# Patient Record
(demographics unavailable — no encounter records)

---

## 2025-05-02 NOTE — HISTORY OF PRESENT ILLNESS
[FreeTextEntry1] : 77F with PMH of HTN, HLD, and recent presentation to [facility] ED for L flank pain and nausea. UA nit neg LE neg. Cr 0.89 Pt was found to have an obstructing L UVJ stone with mild HUN on CTAP, as well as bilateral non-obstructing renal calculi. Pt was discharged with zofran and presents today for follow up. She continues to have mild bilateral flank discomfort, which she reports is similar to her initial presentation. She is unaware if she passed the stone. Denies any further episodes of nausea, no fevers, chills, hematuria. This is her first stone episode.  PMH: HTN, HLD PSH: eye surgery NKDA Fam hx: negative for  malignancy  CTAP 4/24/25: Mild L HUN to bladder with 2mm L UVJ stone and perinephric stranding. Bilateral non-obstructing renal calculi, largest measuring up to 3mm  RBUS today: no residual hydronephrosis and bilateral ureteral jets. Echogenic foci in bilateral kidneys

## 2025-05-02 NOTE — PHYSICAL EXAM
[General Appearance - Well Developed] : well developed [General Appearance - Well Nourished] : well nourished [Normal Appearance] : normal appearance [Well Groomed] : well groomed [General Appearance - In No Acute Distress] : no acute distress [] : no respiratory distress [Exaggerated Use Of Accessory Muscles For Inspiration] : no accessory muscle use [Costovertebral Angle Tenderness] : no ~M costovertebral angle tenderness [Normal Station and Gait] : the gait and station were normal for the patient's age [Oriented To Time, Place, And Person] : oriented to person, place, and time [Affect] : the affect was normal

## 2025-05-02 NOTE — END OF VISIT
[FreeTextEntry3] : I discussed the case with the Resident and agree with the findings and plan as documented in the Resident 's note. I have edited the note to reflect any changes in plan/assessment that I feel are appropriate. I agree with his evaluation and plan.

## 2025-05-02 NOTE — ASSESSMENT
[FreeTextEntry1] : 77F with first time stone episode, 2mm L UVJ stone with mild L HUN and bilateral non-obstructing renal calculi. Today RBUS demonstrated resolved mild hydronephrosis and bilateral ureteral jets, suggestive of stone passage although not definitive. She continues to indorse flank discomfort, although milder than presentation to ED. No fevers or chills. We reviewed the findings of the RBUS and discussed the sensitivities of different imaging modalities. Since she is still having symptoms we will plan to obtain a CTAP in 3 weeks while on MET. She will f/u 1 week after to review results. If she has already passed the stone then we will proceed with a 24hr urine study and plan to obtain a repeat US in 6 months.  - Oral hydration with UOP goal >2.5L - Start flomax and strain urine - The side effects of Tamsulosin were discussed in detail. These include but are not limited to hives, changes in vision, orthostatic hypotension, dizziness, congestion, and retrograde ejaculation. Additionally, the patient should notify his ophthalmologist prior to any surgery for cataracts given the risk of floppy iris syndrome. - CTAP in 3 weeks - RTC in 4 weeks (after CT scan performed) - Plan for 24hr urine study if stone is passed by next visit - ED precautions for infectious symptoms

## 2025-05-30 NOTE — PHYSICAL EXAM
[General Appearance - Well Developed] : well developed [General Appearance - Well Nourished] : well nourished [Normal Appearance] : normal appearance [Well Groomed] : well groomed [General Appearance - In No Acute Distress] : no acute distress [Exaggerated Use Of Accessory Muscles For Inspiration] : no accessory muscle use [] : no respiratory distress [Costovertebral Angle Tenderness] : no ~M costovertebral angle tenderness [Normal Station and Gait] : the gait and station were normal for the patient's age [Oriented To Time, Place, And Person] : oriented to person, place, and time [Affect] : the affect was normal

## 2025-07-11 NOTE — ASSESSMENT
[FreeTextEntry1] : 77 healthy F with first time stone episode, 2mm L UVJ stone with mild L HUN and bilateral non-obstructing renal calculi (maximum diameter 3mm) presents for follow up. Her 2mm L UVJ has passed on MET confirmed on CTAP 5/8/2025. Discussed her litholink results which showed good urine volume (1.740L), low urinary citrate and low pH. Discussed conservative management with increasing citrate rich foods (citrous, tomatoes, melon, and providing diagram of food options) versus supplmentation with potassium citrate and vitamin C. Patient prefers dietary changes for now. We will repeat litholink after 4 months and continue to monitor her b/l renal stones with RBUS.   Plan: Dietary motifications - continue 2-3L hydration, low sodium foods, high citrate diet  Repeat litholink in 4 months after dietary changes  RBUS in 4 months to monitor b/l renal stones  Return precautions RTC In 4-5 months for RBUS and discussion of litholink   I am the primary provider managing this condition and will be seeing the patient longitudinally.

## 2025-07-11 NOTE — HISTORY OF PRESENT ILLNESS
[FreeTextEntry1] : Initial visit: 5/2/25: 77F with PMH of HTN, HLD, and recent presentation to [facility] ED for L flank pain and nausea. UA nit neg LE neg. Cr 0.89 Pt was found to have an obstructing L UVJ stone with mild HUN on CTAP, as well as bilateral non-obstructing renal calculi. Pt was discharged with zofran and presents today for follow up. She continues to have mild bilateral flank discomfort, which she reports is similar to her initial presentation. She is unaware if she passed the stone. Denies any further episodes of nausea, no fevers, chills, hematuria. This is her first stone episode.  PMH: HTN, HLD PSH: eye surgery NKDA Fam hx: negative for  malignancy  CTAP 4/24/25: Mild L HUN to bladder with 2mm L UVJ stone and perinephric stranding. Bilateral non-obstructing renal calculi, largest measuring up to 3mm  RBUS today: no residual hydronephrosis and bilateral ureteral jets. Echogenic foci in bilateral kidneys  ************************** Interval visits:  5/30/25: Presents for follow up for discussion of CT. She had been straining her urine but does not report seeing stone pass. Doing well otherwise, denies fever, chills dysuria, urgency, frequency, flank pain, gross hematuria. Reports to be increasing fluid intake for stone prevention to approximately 2L.   CTAP 5/8/25: Resolution of left UVJ stone, resolution of left hydronephrosis and hydroureter. Stable bilateral non-obstructing renal stones.   ************************** 7/11/25 Presents to discuss litholink results. Reports to be doing well, denies any fever, chills, flank pain, infectious urinary symptoms. Continues to hydrate with 1.5-2.5L of water a day with low sodium diet. Denies any health changes since last appointment. She had two 24 hour litholink showed good urine volume (1.740L), low urinary citrate and high pH.

## 2025-07-11 NOTE — END OF VISIT
[] : Resident [FreeTextEntry3] :  I discussed the case with the Resident and agree with the findings and plan as documented in the Resident 's note. I have edited the note to reflect any changes in plan/assessment that I feel are appropriate. I agree with evaluation and plan.